# Patient Record
Sex: FEMALE | Race: AMERICAN INDIAN OR ALASKA NATIVE
[De-identification: names, ages, dates, MRNs, and addresses within clinical notes are randomized per-mention and may not be internally consistent; named-entity substitution may affect disease eponyms.]

---

## 2019-01-30 ENCOUNTER — HOSPITAL ENCOUNTER (INPATIENT)
Dept: HOSPITAL 5 - ED | Age: 69
LOS: 2 days | Discharge: HOME | DRG: 305 | End: 2019-02-01
Attending: INTERNAL MEDICINE | Admitting: INTERNAL MEDICINE
Payer: MEDICARE

## 2019-01-30 DIAGNOSIS — Z82.49: ICD-10-CM

## 2019-01-30 DIAGNOSIS — M54.12: ICD-10-CM

## 2019-01-30 DIAGNOSIS — R00.1: ICD-10-CM

## 2019-01-30 DIAGNOSIS — I16.0: Primary | ICD-10-CM

## 2019-01-30 DIAGNOSIS — Z23: ICD-10-CM

## 2019-01-30 DIAGNOSIS — Z79.84: ICD-10-CM

## 2019-01-30 DIAGNOSIS — E11.9: ICD-10-CM

## 2019-01-30 DIAGNOSIS — Z91.14: ICD-10-CM

## 2019-01-30 LAB
ANISOCYTOSIS BLD QL SMEAR: (no result)
BAND NEUTROPHILS # (MANUAL): 0 K/MM3
BILIRUB UR QL STRIP: (no result)
BLOOD UR QL VISUAL: (no result)
BUN SERPL-MCNC: 11 MG/DL (ref 7–17)
BUN/CREAT SERPL: 14 %
CALCIUM SERPL-MCNC: 9.4 MG/DL (ref 8.4–10.2)
HCT VFR BLD CALC: 46.2 % (ref 30.3–42.9)
HEMOLYSIS INDEX: 11
HGB BLD-MCNC: 14.9 GM/DL (ref 10.1–14.3)
MCHC RBC AUTO-ENTMCNC: 32 % (ref 30–34)
MCV RBC AUTO: 87 FL (ref 79–97)
MYELOCYTES # (MANUAL): 0 K/MM3
PH UR STRIP: 8 [PH] (ref 5–7)
PLATELET # BLD: 177 K/MM3 (ref 140–440)
PROMYELOCYTES # (MANUAL): 0 K/MM3
PROT UR STRIP-MCNC: (no result) MG/DL
RBC # BLD AUTO: 5.32 M/MM3 (ref 3.65–5.03)
RBC #/AREA URNS HPF: 1 /HPF (ref 0–6)
TOTAL CELLS COUNTED BLD: 100
UROBILINOGEN UR-MCNC: < 2 MG/DL (ref ?–2)
WBC #/AREA URNS HPF: < 1 /HPF (ref 0–6)

## 2019-01-30 PROCEDURE — 93017 CV STRESS TEST TRACING ONLY: CPT

## 2019-01-30 PROCEDURE — 71275 CT ANGIOGRAPHY CHEST: CPT

## 2019-01-30 PROCEDURE — 90686 IIV4 VACC NO PRSV 0.5 ML IM: CPT

## 2019-01-30 PROCEDURE — 84484 ASSAY OF TROPONIN QUANT: CPT

## 2019-01-30 PROCEDURE — 85025 COMPLETE CBC W/AUTO DIFF WBC: CPT

## 2019-01-30 PROCEDURE — 96374 THER/PROPH/DIAG INJ IV PUSH: CPT

## 2019-01-30 PROCEDURE — 85379 FIBRIN DEGRADATION QUANT: CPT

## 2019-01-30 PROCEDURE — 93005 ELECTROCARDIOGRAM TRACING: CPT

## 2019-01-30 PROCEDURE — 70450 CT HEAD/BRAIN W/O DYE: CPT

## 2019-01-30 PROCEDURE — A9502 TC99M TETROFOSMIN: HCPCS

## 2019-01-30 PROCEDURE — 81001 URINALYSIS AUTO W/SCOPE: CPT

## 2019-01-30 PROCEDURE — 36415 COLL VENOUS BLD VENIPUNCTURE: CPT

## 2019-01-30 PROCEDURE — 78452 HT MUSCLE IMAGE SPECT MULT: CPT

## 2019-01-30 PROCEDURE — 93010 ELECTROCARDIOGRAM REPORT: CPT

## 2019-01-30 PROCEDURE — 85007 BL SMEAR W/DIFF WBC COUNT: CPT

## 2019-01-30 PROCEDURE — 80048 BASIC METABOLIC PNL TOTAL CA: CPT

## 2019-01-30 PROCEDURE — 82962 GLUCOSE BLOOD TEST: CPT

## 2019-01-30 PROCEDURE — 90732 PPSV23 VACC 2 YRS+ SUBQ/IM: CPT

## 2019-01-30 PROCEDURE — 82553 CREATINE MB FRACTION: CPT

## 2019-01-30 PROCEDURE — 82550 ASSAY OF CK (CPK): CPT

## 2019-01-30 PROCEDURE — 96375 TX/PRO/DX INJ NEW DRUG ADDON: CPT

## 2019-01-30 PROCEDURE — 71045 X-RAY EXAM CHEST 1 VIEW: CPT

## 2019-01-30 NOTE — EMERGENCY DEPARTMENT REPORT
HPI





- General


Chief Complaint: Neck Pain/Injury


Time Seen by Provider: 19 16:54





- HPI


HPI: 





Room 23





The patient is a 68-year-old female presented with a chief complaint of neck 

shoulder and chest pain.  The patient states for the past 2 days she's had 

intermittent pain in her left neck, left shoulder and left upper extremity as 

well as substernal chest.  Patient states the quality of pain is difficult to 

describe but today became constant.  Patient states she had a slight headache 

yesterday.  Patient denies shortness of breath, nausea/vomiting or diaphoresis. 

Patient denies cough, recent trauma or fever.  The patient states she's been out

of her blood pressure medication for the past 3 months.  The patient states 

she's never had a cardiac catheterization





Location: [See above]


Duration: Since yesterday


Quality: Pain


Severity: Moderate


Modifying factors: [see above]


Context: [see above]


Mode of transportation: [not driving]





ED Past Medical Hx





- Past Medical History


Previous Medical History?: Yes


Hx Hypertension: Yes





- Surgical History


Past Surgical History?: No





- Family History


Family history: no significant





- Social History


Smoking Status: Never Smoker


Substance Use Type: None (denies illicit drug use)





ED Review of Systems


ROS: 


Stated complaint: NECK/HAND/SHOULDER/PAIN


Other details as noted in HPI





Constitutional: denies: diaphoresis, fever


Eyes: denies: eye pain


ENT: denies: throat pain


Respiratory: denies: shortness of breath


Cardiovascular: chest pain


Endocrine: no symptoms reported


Gastrointestinal: denies: nausea, vomiting


Genitourinary: denies: dysuria


Musculoskeletal: myalgia


Neurological: headache





Physical Exam





- Physical Exam


Vital Signs: 


                                   Vital Signs











  19





  16:49


 


Temperature 98.6 F


 


Pulse Rate 69


 


Respiratory 18





Rate 


 


Blood Pressure 229/114


 


O2 Sat by Pulse 97





Oximetry 











Physical Exam: 





GENERAL: The patient is well-developed well-nourished female sitting in chair 

not appearing to be in acute distress. []


HEENT: Normocephalic.  Atraumatic.  Extraocular motions are intact.  Patient has

 moist mucous membranes.


NECK: Supple.  No meningitic signs are noted.  Trachea midline


CHEST/LUNGS: Clear to auscultation.  There is no respiratory distress noted.


HEART/CARDIOVASCULAR: Regular.  There is no tachycardia.  There is no gallop rub

 or murmur.


ABDOMEN: Abdomen is soft, nontender.  Patient has normal bowel sounds.  There is

 no abdominal distention.


SKIN: There is no rash.  There is no edema.  There is no diaphoresis.


NEURO: The patient is awake, alert, and oriented.  The patient is cooperative.  

The patient has no focal neurologic deficits.  The patient has normal speech.  

Cranial nerves II through XII grossly intact, no drift


MUSCULOSKELETAL: There is no evidence of acute injury.





ED Course


                                   Vital Signs











  19





  16:49


 


Temperature 98.6 F


 


Pulse Rate 69


 


Respiratory 18





Rate 


 


Blood Pressure 229/114


 


O2 Sat by Pulse 97





Oximetry 














ED Medical Decision Making





- Lab Data


Result diagrams: 


                                 19 17:24





                                 19 17:24





- EKG Data


-: EKG Interpreted by Me


EKG shows normal: sinus rhythm


Rate: normal





- EKG Data


When compared to previous EKG there are: previous EKG unavailable


Interpretation: other (no ischemic changes seen)





- Radiology Data


Radiology results: report reviewed (CT head), image reviewed (CT head, chest x-

ray)


interpreted by me: 





Chest x-ray-no focal infiltrates, no pneumothor[place]





[id number] [id number] 





Cat Scan Report 


Signed 





Patient: MARISOL GÓMEZ MR#: [id number] 


: 1950 Acct:[id number] 


Age/Sex: 68 / F ADM Date: 19 


Loc: ED 


Attending Dr: 








Ordering Physician: JR JOHN MD 


Date of Service: 19 


Procedure(s): CT head/brain wo con 


Accession Number(s): [id number] 





cc: JR JOHN MD 








FINAL REPORT 





EXAM: CT HEAD/BRAIN WO CON 





HISTORY: hypertension, headache 





TECHNIQUE: CT examination of the head without IV contrast 





PRIORS: None. 





FINDINGS: 


Polyp or retention cyst mid left ethmoid sinus. 





No acute air-fluid level visualized in the included air-filled sinuses. 





Bone windows demonstrate no acute fracture. 





There is ventricular and sulcal prominence compatible with global 

cerebrocortical atrophy. 





The brain contains no mass, mass effect, hemorrhage, or acute infarct. 





There is no extra-axial intracranial bleed, brain bleed, or midline shift. 











IMPRESSION: 


No acute CVA, intracranial bleed, or brain mass 





Polyp or retention cyst mid left ethmoid sinus 











Transcribed By: BAL 


Dictated By: NASREEN MONROE MD 


Electronically Authenticated By: NASREEN MONROE MD 


Signed Date/Time: 19 











DD/DT: 19 


TD/TT: 19





- Differential Diagnosis


hypertensive urgency, ICH, ACS, PE


Critical care attestation.: 


If time is entered above; I have spent that time in minutes in the direct care 

of this critically ill patient, excluding procedure time.








ED Disposition


Clinical Impression: 


 Hypertensive urgency, Chest pain





Disposition:  OP ADMIT IP TO THIS HOSP


Is pt being admited?: Yes


Does the pt Need Aspirin: Yes


Condition: Fair


Instructions:  Chest Pain (ED)


Time of Disposition: 18:42 (hospitalist paged (Dr Gallardo))

## 2019-01-30 NOTE — XRAY REPORT
FINAL REPORT



EXAM:  XR CHEST 1V AP



HISTORY:  chest pain 



TECHNIQUE:  Frontal portable view of the chest



Comparison: None



FINDINGS:  

There is no evidence of infiltrate, pneumothorax or pleural fluid collection.



The cardiac silhouette is normal size.



The thoracic aorta is tortuous.



The bony structures are unremarkable. Visualization detail of the thoracic spine is limited.



IMPRESSION:  

1. No evidence of an acute pulmonary process.

## 2019-01-30 NOTE — CAT SCAN REPORT
FINAL REPORT



EXAM:  CT HEAD/BRAIN WO CON



HISTORY:  hypertension, headache 



TECHNIQUE:  CT examination of the head without IV contrast



PRIORS:  None.



FINDINGS:  

Polyp or retention cyst mid left ethmoid sinus.



No acute air-fluid level visualized in the included air-filled sinuses. 



Bone windows demonstrate no acute fracture. 



There is ventricular and sulcal prominence compatible with global cerebrocortical atrophy. 



The brain contains no mass, mass effect, hemorrhage, or acute infarct. 



There is no extra-axial intracranial bleed, brain bleed, or midline shift.







IMPRESSION:  

No acute CVA, intracranial bleed, or brain mass



Polyp or retention cyst mid left ethmoid sinus

## 2019-01-30 NOTE — HISTORY AND PHYSICAL REPORT
History of Present Illness


Date of examination: 01/30/19


Date of admission: 


01/30/19 18:43





History of present illness: 


68-year-old woman with a history of hypertension,diabetes, ncompliant with 

medications comes emergency room with complaints of chest pain that started 1 

week ago.Pain is in the left jaw radiating to the left upper extremity, and left

posterior shoulder which she describes a dull pain intermittent every hour, 

intensity 5/10,  relief with pain medication given in the ER.  Denies breath, 

nausea, diaphoresis or palpitation.  Has not taken any antihypertensive in the 

last 3 months


Review of systems


Constitutional: no  weight loss, chills, fever


Ears, eyes, nose, mouth and throat: no nasal congestion, no nasal discharge, no 

sinus pressure, no vision change, no red eye.


Neck: No neck pain or rigidity.


Cardiovascular: no  palpitations


Respiratory: no cough, shortness of breath


Gastrointestinal: no abdominal pain hematochezia


Genitourinary : no  frequency , no hematuria


Musculoskeletal: no joint swelling or muscle ache 


Integumentary: no rash, no pruritis


Neurological: no parathesias, no numbness, no focal weakness


Endocrine: no cold or heat intolerance, no polyuria or polydipsia


Hematologic/Lymphatic: no easy bruising, no easy bleeding, no gland swelling


Allergic/Immunologic: no urticaria, no angioedema.





PAST MEDICAL HISTORY: hypertension





PAST SURGICAL HISTORY: None





SOCIAL HISTORY: Denies alcohol, tobacco, drugs





FAMILY HISTORY: Hypertension

















Medications and Allergies


                                    Allergies











Allergy/AdvReac Type Severity Reaction Status Date / Time


 


No Known Allergies Allergy   Unverified 01/30/19 16:51











                                Home Medications











 Medication  Instructions  Recorded  Confirmed  Last Taken  Type


 


Lisinopril/Hydrochlorothiazide 1 tab PO DAILY 01/30/19 01/30/19 Unknown History


 


Metformin HCl 1 gm PO BID 01/30/19 01/30/19 Unknown History














Exam





- Physical Exam


Narrative exam: 


General Apperance: The patient lying in bed,  breathing comfortable 


HEENT: Normocephalic, atraumatic.  Pupils equally round and reactive to light, 

EOMI, no sclericterus or JVD or thyromegaly or nodule.  , no carotid bruit, 

mucous membranes moist, no exudate or erythema


Heart: S1-S2, regular is rhythm


Lungs: Clear to auscultation bilaterally, breathing comfortable


Abdomen: Positive bowel sounds, soft, nontender, nondistended, no organomegaly


Extremities: No edema cyanosis clubbing


Skin: no rash, nodule, warm and dry


Neuro: cranial nerves 2-12 intact, speech is fluent, motor/sensory intact








- Constitutional


Vitals: 


                                        











Temp Pulse Resp BP Pulse Ox


 


 98.3 F   57 L  18   141/75   100 


 


 01/30/19 17:10  01/30/19 19:15  01/30/19 19:16  01/30/19 19:15  01/30/19 19:16














Results





- Labs


CBC & Chem 7: 


                                 01/30/19 17:24





                                 01/30/19 17:24


Labs: 


                              Abnormal lab results











  01/30/19 01/30/19 01/30/19 Range/Units





  17:24 17:24 17:24 


 


RBC  5.32 H    (3.65-5.03)  M/mm3


 


Hgb  14.9 H    (10.1-14.3)  gm/dl


 


Hct  46.2 H    (30.3-42.9)  %


 


Lymphocytes % (Manual)  42.0 H    (13.4-35.0)  %


 


D-Dimer    248.59 H  (0-234)  ng/mlDDU


 


Carbon Dioxide   31 H   (22-30)  mmol/L


 


Glucose   112 H   ()  mg/dL


 


Total Creatine Kinase   180 H   ()  units/L


 


Urine pH     (5.0-7.0)  














  01/30/19 Range/Units





  19:00 


 


RBC   (3.65-5.03)  M/mm3


 


Hgb   (10.1-14.3)  gm/dl


 


Hct   (30.3-42.9)  %


 


Lymphocytes % (Manual)   (13.4-35.0)  %


 


D-Dimer   (0-234)  ng/mlDDU


 


Carbon Dioxide   (22-30)  mmol/L


 


Glucose   ()  mg/dL


 


Total Creatine Kinase   ()  units/L


 


Urine pH  8.0 H  (5.0-7.0)  














- Imaging and Cardiology


EKG: image reviewed


Chest x-ray: report reviewed


CT Scan - head: report reviewed





Assessment and Plan


Assessment


Hypertensive urgency, malignant


Chest pain most likely secondary to #1


Diabetes


Plan


Check cardiac enzymes, stress test


Start aspirin, IV hydralazine


Check CT chest, elevated d-dimer


Restart her outpatient medications


Check fingersticks and initiate insulin sliding scale


DVT prophylaxis

## 2019-01-31 LAB
BASOPHILS # (AUTO): 0 K/MM3 (ref 0–0.1)
BASOPHILS NFR BLD AUTO: 0.7 % (ref 0–1.8)
BUN SERPL-MCNC: 12 MG/DL (ref 7–17)
BUN/CREAT SERPL: 15 %
CALCIUM SERPL-MCNC: 8.9 MG/DL (ref 8.4–10.2)
EOSINOPHIL # BLD AUTO: 0 K/MM3 (ref 0–0.4)
EOSINOPHIL NFR BLD AUTO: 0.6 % (ref 0–4.3)
HCT VFR BLD CALC: 42 % (ref 30.3–42.9)
HEMOLYSIS INDEX: 24
HGB BLD-MCNC: 13.2 GM/DL (ref 10.1–14.3)
LYMPHOCYTES # BLD AUTO: 1.7 K/MM3 (ref 1.2–5.4)
LYMPHOCYTES NFR BLD AUTO: 36.4 % (ref 13.4–35)
MCHC RBC AUTO-ENTMCNC: 32 % (ref 30–34)
MCV RBC AUTO: 88 FL (ref 79–97)
MONOCYTES # (AUTO): 0.3 K/MM3 (ref 0–0.8)
MONOCYTES % (AUTO): 6.4 % (ref 0–7.3)
PLATELET # BLD: 149 K/MM3 (ref 140–440)
RBC # BLD AUTO: 4.79 M/MM3 (ref 3.65–5.03)

## 2019-01-31 PROCEDURE — 3E0234Z INTRODUCTION OF SERUM, TOXOID AND VACCINE INTO MUSCLE, PERCUTANEOUS APPROACH: ICD-10-PCS | Performed by: INTERNAL MEDICINE

## 2019-01-31 RX ADMIN — ASPIRIN SCH MG: 81 TABLET, CHEWABLE ORAL at 14:15

## 2019-01-31 RX ADMIN — MORPHINE SULFATE PRN MG: 2 INJECTION, SOLUTION INTRAMUSCULAR; INTRAVENOUS at 01:38

## 2019-01-31 RX ADMIN — Medication SCH ML: at 14:15

## 2019-01-31 RX ADMIN — MORPHINE SULFATE PRN MG: 2 INJECTION, SOLUTION INTRAMUSCULAR; INTRAVENOUS at 18:49

## 2019-01-31 RX ADMIN — Medication SCH ML: at 22:32

## 2019-01-31 RX ADMIN — MORPHINE SULFATE PRN MG: 2 INJECTION, SOLUTION INTRAMUSCULAR; INTRAVENOUS at 22:37

## 2019-01-31 RX ADMIN — NIFEDIPINE SCH MG: 60 TABLET, EXTENDED RELEASE ORAL at 15:43

## 2019-01-31 RX ADMIN — ACETAMINOPHEN PRN MG: 325 TABLET ORAL at 09:44

## 2019-01-31 NOTE — CAT SCAN REPORT
FINAL REPORT



EXAM:  CT ANGIO CHEST



HISTORY:  pe protocol 



TECHNIQUE:  A CT angiogram was performed following the intravenous injection of 100 cc of Omnipaque 3
50. Rotational, sagittal, and coronal MIP reconstructions were reviewed.



FINDINGS:  

There is no evidence of pulmonary embolus or aortic dissection. The thoracic aorta is normal in confi
guration. The heart size is normal. Pericardial fluid is not seen. There is no evidence of adenopathy
. The lungs are negative for infiltrates or effusions. There is minimal atelectatic changes in the ri
ght lower lobe. In the upper abdomen the adrenal glands appear normal. At the thoracic inlet the thyr
oid gland appears normal. The skeletal structures are well-maintained.



IMPRESSION:  

No evidence of pulmonary embolus, aortic dissection, or vascular congestion.



Minimal atelectatic changes in the right lower lobe. No acute infiltrates or effusions.

## 2019-01-31 NOTE — TREADMILL REPORT
THALLIUM STRESS TEST REPORT



LEFT VENTRICLE:  Left ventricular chamber size is within normal spread. 

Perfusion study demonstrates homogeneous uptake of the tracer in all segments,

no significant perfusion defects identified.  Gated analysis is not available.



CONCLUSION:  Normal myocardial perfusion study.





DD: 01/31/2019 13:58

DT: 01/31/2019 23:20

JOB# [id number]  [id number]

CA/NTS

## 2019-01-31 NOTE — PROGRESS NOTE
Assessment and Plan


Assessment and plan: 





68-year-old woman with a history of hypertension,diabetes, ncompliant with 

medications comes emergency room with complaints of chest pain that started 1 

week ago.Pain is in the left jaw radiating to the left upper extremity, and left

posterior shoulder 





PAST MEDICAL HISTORY: hypertension





Hospital course/plan


all  AV itzel blockers have been discontinued. 


 Blood pressure medications are being optimized.


sp   stress test








Diagnosis


Hypertensive urgency


Chest pain, most likely due to hypertensive urgency


Bradycardia





dvt ppx- lovenox





History


Interval history: 





Review of systems


Constitutional: No fevers, no malaise, no joint pains





CVS: No chest pain, no orthopnea, no dyspnea on exertion, no pedal edema





GI: No abdominal pain, no diarrhea, no vomiting, no constipation





Respiratory: No shortness of breath, no wheezing, no coughing





Hospitalist Physical





- Physical exam


Narrative exam: 





General.: Appears well, no distress, nontoxic


HEENT: Moist mucous membranes, extraocular muscles intact, no lymphadenopathy


Neck: supple


Cardiac: S1-S2 heard


Lungs: clear to auscultation bilaterally


Abdomen: soft , nontender,  nondistended,  bowel sounds positive


Extremities: no edema clubbing or cyanosis


Skin: no rash or lesions


Neurologic: no gross focal deficits


Psych: calm, and cooperative





- Constitutional


Vitals: 


                                        











Temp Pulse Resp BP Pulse Ox


 


 97.4 F L  57 L  18   182/78   95 


 


 01/31/19 12:28  01/31/19 12:28  01/31/19 12:28  01/31/19 12:28  01/31/19 11:52














Results





- Labs


CBC & Chem 7: 


                                 01/31/19 05:51





                                 01/31/19 05:51


Labs: 


                             Laboratory Last Values











WBC  4.7 K/mm3 (4.5-11.0)   01/31/19  05:51    


 


RBC  4.79 M/mm3 (3.65-5.03)   01/31/19  05:51    


 


Hgb  13.2 gm/dl (10.1-14.3)   01/31/19  05:51    


 


Hct  42.0 % (30.3-42.9)   01/31/19  05:51    


 


MCV  88 fl (79-97)   01/31/19  05:51    


 


MCH  28 pg (28-32)   01/31/19  05:51    


 


MCHC  32 % (30-34)   01/31/19  05:51    


 


RDW  15.4 % (13.2-15.2)  H  01/31/19  05:51    


 


Plt Count  149 K/mm3 (140-440)   01/31/19  05:51    


 


Lymph % (Auto)  36.4 % (13.4-35.0)  H  01/31/19  05:51    


 


Mono % (Auto)  6.4 % (0.0-7.3)   01/31/19  05:51    


 


Eos % (Auto)  0.6 % (0.0-4.3)   01/31/19  05:51    


 


Baso % (Auto)  0.7 % (0.0-1.8)   01/31/19  05:51    


 


Lymph #  1.7 K/mm3 (1.2-5.4)   01/31/19  05:51    


 


Mono #  0.3 K/mm3 (0.0-0.8)   01/31/19  05:51    


 


Eos #  0.0 K/mm3 (0.0-0.4)   01/31/19  05:51    


 


Baso #  0.0 K/mm3 (0.0-0.1)   01/31/19  05:51    


 


Add Manual Diff  Complete   01/30/19  17:24    


 


Total Counted  100   01/30/19  17:24    


 


Seg Neutrophils %  55.9 % (40.0-70.0)   01/31/19  05:51    


 


Seg Neuts % (Manual)  52.0 % (40.0-70.0)   01/30/19  17:24    


 


Band Neutrophils %  0 %  01/30/19  17:24    


 


Lymphocytes % (Manual)  42.0 % (13.4-35.0)  H  01/30/19  17:24    


 


Reactive Lymphs % (Man)  0 %  01/30/19  17:24    


 


Monocytes % (Manual)  6.0 % (0.0-7.3)   01/30/19  17:24    


 


Eosinophils % (Manual)  0 % (0.0-4.3)   01/30/19  17:24    


 


Basophils % (Manual)  0 % (0.0-1.8)   01/30/19  17:24    


 


Metamyelocytes %  0 %  01/30/19  17:24    


 


Myelocytes %  0 %  01/30/19  17:24    


 


Promyelocytes %  0 %  01/30/19  17:24    


 


Blast Cells %  0 %  01/30/19  17:24    


 


Nucleated RBC %  Not Reportable   01/30/19  17:24    


 


Seg Neutrophils #  2.6 K/mm3 (1.8-7.7)   01/31/19  05:51    


 


Seg Neutrophils # Man  2.5 K/mm3 (1.8-7.7)   01/30/19  17:24    


 


Band Neutrophils #  0.0 K/mm3  01/30/19  17:24    


 


Lymphocytes # (Manual)  2.0 K/mm3 (1.2-5.4)   01/30/19  17:24    


 


Abs React Lymphs (Man)  0.0 K/mm3  01/30/19  17:24    


 


Monocytes # (Manual)  0.3 K/mm3 (0.0-0.8)   01/30/19  17:24    


 


Eosinophils # (Manual)  0.0 K/mm3 (0.0-0.4)   01/30/19  17:24    


 


Basophils # (Manual)  0.0 K/mm3 (0.0-0.1)   01/30/19  17:24    


 


Metamyelocytes #  0.0 K/mm3  01/30/19  17:24    


 


Myelocytes #  0.0 K/mm3  01/30/19  17:24    


 


Promyelocytes #  0.0 K/mm3  01/30/19  17:24    


 


Blast Cells #  0.0 K/mm3  01/30/19  17:24    


 


WBC Morphology  Not Reportable   01/30/19  17:24    


 


Hypersegmented Neuts  Not Reportable   01/30/19  17:24    


 


Hyposegmented Neuts  Not Reportable   01/30/19  17:24    


 


Hypogranular Neuts  Not Reportable   01/30/19  17:24    


 


Smudge Cells  Not Reportable   01/30/19  17:24    


 


Toxic Granulation  Not Reportable   01/30/19  17:24    


 


Toxic Vacuolation  Not Reportable   01/30/19  17:24    


 


Dohle Bodies  Not Reportable   01/30/19  17:24    


 


Pelger-Huet Anomaly  Not Reportable   01/30/19  17:24    


 


Ivan Rods  Not Reportable   01/30/19  17:24    


 


Platelet Estimate  Consistent w auto   01/30/19  17:24    


 


Clumped Platelets  Not Reportable   01/30/19  17:24    


 


Plt Clumps, EDTA  Not Reportable   01/30/19  17:24    


 


Large Platelets  Not Reportable   01/30/19  17:24    


 


Giant Platelets  Not Reportable   01/30/19  17:24    


 


Platelet Satelliting  Not Reportable   01/30/19  17:24    


 


Plt Morphology Comment  Not Reportable   01/30/19  17:24    


 


RBC Morphology  Not Reportable   01/30/19  17:24    


 


Dimorphic RBCs  Not Reportable   01/30/19  17:24    


 


Polychromasia  Not Reportable   01/30/19  17:24    


 


Hypochromasia  Not Reportable   01/30/19  17:24    


 


Poikilocytosis  Not Reportable   01/30/19  17:24    


 


Anisocytosis  1+   01/30/19  17:24    


 


Microcytosis  Not Reportable   01/30/19  17:24    


 


Macrocytosis  Not Reportable   01/30/19  17:24    


 


Spherocytes  Not Reportable   01/30/19  17:24    


 


Pappenheimer Bodies  Not Reportable   01/30/19  17:24    


 


Sickle Cells  Not Reportable   01/30/19  17:24    


 


Target Cells  Not Reportable   01/30/19  17:24    


 


Tear Drop Cells  Not Reportable   01/30/19  17:24    


 


Ovalocytes  Not Reportable   01/30/19  17:24    


 


Helmet Cells  Not Reportable   01/30/19  17:24    


 


Stephens-Cuthbert Bodies  Not Reportable   01/30/19  17:24    


 


Cabot Rings  Not Reportable   01/30/19  17:24    


 


Cindy Cells  Not Reportable   01/30/19  17:24    


 


Bite Cells  Not Reportable   01/30/19  17:24    


 


Crenated Cell  Not Reportable   01/30/19  17:24    


 


Elliptocytes  Not Reportable   01/30/19  17:24    


 


Acanthocytes (Spur)  Not Reportable   01/30/19  17:24    


 


Rouleaux  Not Reportable   01/30/19  17:24    


 


Hemoglobin C Crystals  Not Reportable   01/30/19  17:24    


 


Schistocytes  Not Reportable   01/30/19  17:24    


 


Malaria parasites  Not Reportable   01/30/19  17:24    


 


Jimmy Bodies  Not Reportable   01/30/19  17:24    


 


Hem Pathologist Commnt  No   01/30/19  17:24    


 


D-Dimer  248.59 ng/mlDDU (0-234)  H  01/30/19  17:24    


 


Sodium  142 mmol/L (137-145)   01/31/19  05:51    


 


Potassium  4.7 mmol/L (3.6-5.0)   01/31/19  05:51    


 


Chloride  104.4 mmol/L ()   01/31/19  05:51    


 


Carbon Dioxide  25 mmol/L (22-30)   01/31/19  05:51    


 


Anion Gap  17 mmol/L  01/31/19  05:51    


 


BUN  12 mg/dL (7-17)   01/31/19  05:51    


 


Creatinine  0.8 mg/dL (0.7-1.2)   01/31/19  05:51    


 


Estimated GFR  > 60 ml/min  01/31/19  05:51    


 


BUN/Creatinine Ratio  15 %  01/31/19  05:51    


 


Glucose  101 mg/dL ()  H  01/31/19  05:51    


 


POC Glucose  98  ()   01/31/19  06:28    


 


Calcium  8.9 mg/dL (8.4-10.2)   01/31/19  05:51    


 


Total Creatine Kinase  124 units/L ()   01/31/19  05:51    


 


CK-MB (CK-2)  1.7 ng/mL (0.0-4.0)   01/31/19  05:51    


 


CK-MB (CK-2) Rel Index  1.3  (0-4)   01/31/19  05:51    


 


Troponin T  < 0.010 ng/mL (0.00-0.029)   01/31/19  05:51    


 


Urine Color  Straw  (Yellow)   01/30/19  19:00    


 


Urine Turbidity  Clear  (Clear)   01/30/19  19:00    


 


Urine pH  8.0  (5.0-7.0)  H  01/30/19  19:00    


 


Ur Specific Gravity  1.008  (1.003-1.030)   01/30/19  19:00    


 


Urine Protein  <15 mg/dl mg/dL (Negative)   01/30/19  19:00    


 


Urine Glucose (UA)  Neg mg/dL (Negative)   01/30/19  19:00    


 


Urine Ketones  Neg mg/dL (Negative)   01/30/19  19:00    


 


Urine Blood  Neg  (Negative)   01/30/19  19:00    


 


Urine Nitrite  Neg  (Negative)   01/30/19  19:00    


 


Urine Bilirubin  Neg  (Negative)   01/30/19  19:00    


 


Urine Urobilinogen  < 2.0 mg/dL (<2.0)   01/30/19  19:00    


 


Ur Leukocyte Esterase  Neg  (Negative)   01/30/19  19:00    


 


Urine WBC (Auto)  < 1.0 /HPF (0.0-6.0)   01/30/19  19:00    


 


Urine RBC (Auto)  1.0 /HPF (0.0-6.0)   01/30/19  19:00    


 


U Epithel Cells (Auto)  1.0 /HPF (0-13.0)   01/30/19  19:00

## 2019-02-01 RX ADMIN — NIFEDIPINE SCH MG: 60 TABLET, EXTENDED RELEASE ORAL at 10:58

## 2019-02-01 RX ADMIN — ACETAMINOPHEN PRN MG: 325 TABLET ORAL at 10:55

## 2019-02-01 RX ADMIN — ASPIRIN SCH MG: 81 TABLET, CHEWABLE ORAL at 10:56

## 2019-02-01 RX ADMIN — Medication SCH ML: at 11:00

## 2019-02-01 RX ADMIN — MORPHINE SULFATE PRN MG: 2 INJECTION, SOLUTION INTRAMUSCULAR; INTRAVENOUS at 05:53

## 2019-02-01 NOTE — DISCHARGE SUMMARY
Providers





- Providers


Date of Admission: 


01/30/19 18:43





Attending physician: 


JAJA CERDA MD





Primary care physician: 


WOODY RODRIGUEZ








Hospitalization


Condition: Fair


Hospital course: 





68-year-old woman who presents with chest pain.  ACS was ruled out.  She went on

to have a stress test that was negative.  She had very elevated blood pressure. 

She stated that she had been on some blood pressure medications, but was hoping 

that it'll go away with her being less stress.  She was counseled on blood 

pressure medication compliance, she is advised to see her doctor for blood 

pressure check within 2 weeks of discharge.  She was noted to be bradycardic, 

therefore all AV itzel blockage agents were discontinued.  Her blood pressure 

medications were optimized and she was subsequently discharged.  She complained 

of neck pain radiating down her left arm consistent with cervical radiculopathy,

therefore she was given Medrol Dosepak





Diagnoses


Hypertensive urgency


Chest pain due to hypertensive urgency


Bradycardia


cervical radiculopathy


Disposition: DC-01 TO HOME OR SELFCARE


Time spent for discharge: 33 mins





Core Measure Documentation





- Palliative Care


Palliative Care/ Comfort Measures: Not Applicable





- Core Measures


Any of the following diagnoses?: none





Exam





- Constitutional


Vitals: 


                                        











Temp Pulse Resp BP Pulse Ox


 


 98.0 F   64   20   168/69   97 


 


 02/01/19 08:34  02/01/19 08:34  02/01/19 08:34  02/01/19 08:34  02/01/19 10:00











General appearance: Present: no acute distress, well-nourished





- EENT


Eyes: Present: PERRL


ENT: hearing intact, clear oral mucosa





- Neck


Neck: Present: supple, normal ROM





- Respiratory


Respiratory effort: normal


Respiratory: bilateral: CTA





- Cardiovascular


Heart Sounds: Present: S1 & S2.  Absent: rub, click





- Extremities


Extremities: pulses symmetrical, No edema


Peripheral Pulses: within normal limits





- Abdominal


General gastrointestinal: Present: soft, non-tender, non-distended, normal bowel

sounds


Female genitourinary: Present: normal





- Integumentary


Integumentary: Present: clear, warm, dry





- Musculoskeletal


Musculoskeletal: gait normal, strength equal bilaterally





- Psychiatric


Psychiatric: appropriate mood/affect, intact judgment & insight





- Neurologic


Neurologic: CNII-XII intact, moves all extremities





Plan


Follow up with: 


WOODY RODRIGUEZ MD [Primary Care Provider] - 7 Days


Forms:  Work/School Release Form


Prescriptions: 


hydroCHLOROthiazide [HCTZ] 25 mg PO QDAY #30 tablet


RX: Lisinopril [Zestril TAB] 40 mg PO QDAY #30 tablet


Metformin HCl 1 gm PO BID #60


methylPREDNISolone [Medrol] 4 mg PO DAILY #1 tab.ds.pk


RX: NIFEdipine XL [Procardia Xl] 60 mg PO Q12HR #60 tab

## 2019-02-03 VITALS — SYSTOLIC BLOOD PRESSURE: 168 MMHG | DIASTOLIC BLOOD PRESSURE: 69 MMHG

## 2019-08-14 ENCOUNTER — HOSPITAL ENCOUNTER (EMERGENCY)
Dept: HOSPITAL 5 - ED | Age: 69
Discharge: HOME | End: 2019-08-14
Payer: COMMERCIAL

## 2019-08-14 VITALS — DIASTOLIC BLOOD PRESSURE: 85 MMHG | SYSTOLIC BLOOD PRESSURE: 174 MMHG

## 2019-08-14 DIAGNOSIS — Z79.84: ICD-10-CM

## 2019-08-14 DIAGNOSIS — K29.00: Primary | ICD-10-CM

## 2019-08-14 DIAGNOSIS — Z79.899: ICD-10-CM

## 2019-08-14 DIAGNOSIS — E11.9: ICD-10-CM

## 2019-08-14 DIAGNOSIS — I10: ICD-10-CM

## 2019-08-14 LAB
ALBUMIN SERPL-MCNC: 4.5 G/DL (ref 3.9–5)
ALT SERPL-CCNC: 30 UNITS/L (ref 7–56)
BASOPHILS # (AUTO): 0 K/MM3 (ref 0–0.1)
BASOPHILS NFR BLD AUTO: 0.3 % (ref 0–1.8)
BILIRUB UR QL STRIP: (no result)
BLOOD UR QL VISUAL: (no result)
BUN SERPL-MCNC: 17 MG/DL (ref 7–17)
BUN/CREAT SERPL: 19 %
CALCIUM SERPL-MCNC: 9.8 MG/DL (ref 8.4–10.2)
EOSINOPHIL # BLD AUTO: 0 K/MM3 (ref 0–0.4)
EOSINOPHIL NFR BLD AUTO: 0.3 % (ref 0–4.3)
HCT VFR BLD CALC: 44.5 % (ref 30.3–42.9)
HEMOLYSIS INDEX: 17
HGB BLD-MCNC: 14.4 GM/DL (ref 10.1–14.3)
LYMPHOCYTES # BLD AUTO: 1.2 K/MM3 (ref 1.2–5.4)
LYMPHOCYTES NFR BLD AUTO: 18.1 % (ref 13.4–35)
MCHC RBC AUTO-ENTMCNC: 32 % (ref 30–34)
MCV RBC AUTO: 87 FL (ref 79–97)
MONOCYTES # (AUTO): 0.3 K/MM3 (ref 0–0.8)
MONOCYTES % (AUTO): 5 % (ref 0–7.3)
MUCOUS THREADS #/AREA URNS HPF: (no result) /HPF
PH UR STRIP: 6 [PH] (ref 5–7)
PLATELET # BLD: 171 K/MM3 (ref 140–440)
RBC # BLD AUTO: 5.12 M/MM3 (ref 3.65–5.03)
RBC #/AREA URNS HPF: 3 /HPF (ref 0–6)
UROBILINOGEN UR-MCNC: 2 MG/DL (ref ?–2)
WBC #/AREA URNS HPF: 2 /HPF (ref 0–6)

## 2019-08-14 PROCEDURE — 83690 ASSAY OF LIPASE: CPT

## 2019-08-14 PROCEDURE — 36415 COLL VENOUS BLD VENIPUNCTURE: CPT

## 2019-08-14 PROCEDURE — 85025 COMPLETE CBC W/AUTO DIFF WBC: CPT

## 2019-08-14 PROCEDURE — 81001 URINALYSIS AUTO W/SCOPE: CPT

## 2019-08-14 PROCEDURE — 80053 COMPREHEN METABOLIC PANEL: CPT

## 2019-08-14 NOTE — EVENT NOTE
ED Screening Note


Date of service: 08/14/19


Time: 16:49


ED Screening Note: 





Patient reports abdominal pain since yesterday.





PMH DM2, HTN





Patient states she was off medication for 3 months and just restarted medication

1 week ago.





This initial assessment/diagnostic orders/clinical plan/treatment(s) is/are 

subject to change based on patients health status, clinical progression and re-

assessment by fellow clinical providers in the ED. Further treatment and workup 

at subsequent clinical providers discretion. Patient/guardian urged not to elope

from the ED as their condition may be serious if not clinically assessed and 

managed. 





Initial orders include: 





Labs

## 2019-08-14 NOTE — EMERGENCY DEPARTMENT REPORT
ED General Adult HPI





- General


Chief complaint: Abdominal Pain


Stated complaint: HURTING ALL OVER


Time Seen by Provider: 08/14/19 16:49


Source: patient


Mode of arrival: Ambulatory


Limitations: No Limitations





- History of Present Illness


Initial comments: 





This is a 69-year-old female well with a history of diabetes and hypertension on

medication who presents to ED complaining of some abdominal discomfort that 

started today after she E some food.  Patient states that discomfort is in her 

upper mid abdomen.  Patient states that it feels like she is bloated and has a 

lot of gas.  She denies abdominal pain and says is more of a discomfort than 

pain.  Patient states she just started taking her blood pressure medication and 

diabetes as she has been off of it for the past 2 months due to insurance 

issues.  Patient states now she is taking and follows up with her primary care 

doctor MICHELLE Peña.


 She denies nausea vomiting and diarrhea





- Related Data


                                  Previous Rx's











 Medication  Instructions  Recorded  Last Taken  Type


 


Lisinopril [Zestril TAB] 40 mg PO QDAY #30 tablet 02/01/19 Unknown Rx


 


Metformin HCl 1 gm PO BID #60 02/01/19 Unknown Rx


 


NIFEdipine XL [Procardia Xl] 60 mg PO Q12HR #60 tab 02/01/19 Unknown Rx


 


hydroCHLOROthiazide [HCTZ] 25 mg PO QDAY #30 tablet 02/01/19 Unknown Rx


 


methylPREDNISolone [Medrol] 4 mg PO DAILY #1 tab.ds.pk 02/01/19 Unknown Rx


 


Famotidine [Pepcid] 20 mg PO BID #30 tablet 08/14/19 Unknown Rx











                                    Allergies











Allergy/AdvReac Type Severity Reaction Status Date / Time


 


No Known Allergies Allergy   Unverified 01/30/19 16:51














ED Review of Systems


ROS: 


Stated complaint: HURTING ALL OVER


Other details as noted in HPI





Comment: All other systems reviewed and negative





ED Past Medical Hx





- Past Medical History


Hx Hypertension: Yes


Hx Congestive Heart Failure: No


Hx Diabetes: Yes


Hx of Cancer: No


Hx Asthma: No


Hx COPD: No





- Social History


Smoking Status: Never Smoker


Substance Use Type: None





- Medications


Home Medications: 


                                Home Medications











 Medication  Instructions  Recorded  Confirmed  Last Taken  Type


 


Lisinopril [Zestril TAB] 40 mg PO QDAY #30 tablet 02/01/19  Unknown Rx


 


Metformin HCl 1 gm PO BID #60 02/01/19  Unknown Rx


 


NIFEdipine XL [Procardia Xl] 60 mg PO Q12HR #60 tab 02/01/19  Unknown Rx


 


hydroCHLOROthiazide [HCTZ] 25 mg PO QDAY #30 tablet 02/01/19  Unknown Rx


 


methylPREDNISolone [Medrol] 4 mg PO DAILY #1 tab.ds.pk 02/01/19  Unknown Rx


 


Famotidine [Pepcid] 20 mg PO BID #30 tablet 08/14/19  Unknown Rx














ED Physical Exam





- General


Limitations: No Limitations


General appearance: alert, in no apparent distress





- Head


Head exam: Present: atraumatic, normocephalic





- Eye


Eye exam: Present: normal appearance





- ENT


ENT exam: Present: mucous membranes moist





- Neck


Neck exam: Present: normal inspection





- Respiratory


Respiratory exam: Present: normal lung sounds bilaterally.  Absent: respiratory 

distress





- Cardiovascular


Cardiovascular Exam: Present: regular rate, normal rhythm.  Absent: systolic 

murmur, diastolic murmur, rubs, gallop





- GI/Abdominal


GI/Abdominal exam: Present: soft, normal bowel sounds.  Absent: distended, 

tenderness, guarding, mass, bruit, pulsatile mass





- Extremities Exam


Extremities exam: Present: normal inspection





- Back Exam


Back exam: Present: normal inspection





- Neurological Exam


Neurological exam: Present: alert, oriented X3





- Psychiatric


Psychiatric exam: Present: normal affect, normal mood





- Skin


Skin exam: Present: warm, dry, intact, normal color.  Absent: rash





ED Course





                                   Vital Signs











  08/14/19





  16:49


 


Temperature 97.8 F


 


Pulse Rate 74


 


Respiratory 18





Rate 


 


Blood Pressure 164/70


 


O2 Sat by Pulse 100





Oximetry 














ED Medical Decision Making





- Lab Data


Result diagrams: 


                                 08/14/19 17:06





                                 08/14/19 17:06





- Medical Decision Making





69-year-old female presents with possible gastritis.


Discussed the patient's family.  Pepcid to help settle her stomach discomfort.


Urinalysis shows no abnormal findings


I also discussed the patient follow up with her primary care physician.


I discussed the results with the patient.


Patient appears to be in no acute respiratory distress throughout the ED stay.  

She is sitting comfortably in the ED.  There was no tenderness during 

examination.


All labs are within normal limits no signs of leukocytosis or electrolyte 

imbalance.


Critical care attestation.: 


If time is entered above; I have spent that time in minutes in the direct care 

of this critically ill patient, excluding procedure time.








ED Disposition


Clinical Impression: 


 Acute gastritis





Disposition: DC-01 TO HOME OR SELFCARE


Is pt being admited?: No


Does the pt Need Aspirin: No


Condition: Stable


Instructions:  Abdominal Pain (ED), Diet for Ulcers and Gastritis (ED), 

Gastritis (ED)


Additional Instructions: 


Make sure to follow up with the primary care physician as discussed.


Take all your medications as you've been prescribed.


If you have any worsening symptoms or develop new symptoms please return to ED 

immediately.


Prescriptions: 


Famotidine [Pepcid] 20 mg PO BID #30 tablet


Referrals: 


[facility] [Provider Group] - 3-5 Days


[facility] [Provider Group] - 3-5 Days


Forms:  Accompanied Note, Work/School Release Form(ED)


Time of Disposition: 20:16